# Patient Record
(demographics unavailable — no encounter records)

---

## 2024-10-09 NOTE — HISTORY OF PRESENT ILLNESS
[de-identified] : Patient's 10-year-old right-hand-dominant male injured his left wrist today when he tripped and fell at school at the end of recess.  Patient was complained of pain and swelling in his left wrist he went to [facility] MD was diagnosed with a buckle fracture.  Now presents for hand consultation with his parents.  Denies any numbness or tingling.

## 2024-10-09 NOTE — DATA REVIEWED
[FreeTextEntry1] : X-rays left wrist and forearm from urgent care revealed a left distal radius Salter I fracture possible Salter II as well as a appears to have a buckle fracture type component

## 2024-10-09 NOTE — PROCEDURE
[FreeTextEntry3] : A volar resting splint was applied in the functional position to the left wrist.  Splint care was discussed with patient and parents. The patient and family were advised to keep the splint clean and dry and not to put anything down the splint and to keep it elevated and to do ROM to finger/toes. Compartment syndrome was discussed. Patient was advised if they develop any numbness or tingling at any time or if the splint feels too tight or to lose, they must call the office immediately or go to the ER. They were advised to watch for any issues with circulation.

## 2024-10-09 NOTE — ASSESSMENT
[FreeTextEntry1] : Left distal radius Salter I type fracture possible Salter II or possible buckle component  Plan anti-inflammatories with GI precautions ice 20 minutes on 20 minutes off and the patient is being placed in a volar resting splint for comfort and protection as the fracture happened earlier today and concerned about casting him due to swelling.  I did advise the patient's parents at this time there is a buckle type component to the fracture most likely a Salter I fracture but could also have a Salter II component.  I did advise anytime there is a growth plate injury there is a small chance of growth plate arrest.  Plan is to remain out of sports and gym and seen back later this week by one of our other hand surgeons to place him in a short arm cast when the swelling has resolved and will not swelling more.  All questions were answered they are agreed with the plan they do understand he could require a cast up to 4 to 6 weeks  Patient was advised if they develop any severe pain, numbness or tingling or pain with range of motion to the fingers they must call or go to the emergency room immediately. All the signs and symptoms of compartment syndrome were explained.  The patient understands.  This medical record was created utilizing Dragon voice recognition software.  This software is not perfect and may occasionally create typographical errors which may be reflected in the above medical record.

## 2024-10-09 NOTE — IMAGING
[Left] : left wrist [de-identified] : He is alert and oriented vital signs are stable.  Examination left wrist reveals mild to moderate swelling.  He has no tenderness around the scaphoid volarly or dorsally or the scaphoid ligament or TFCC.  Does have exquisite tenderness at the distal radius growth plate.  The FPL and EPL are intact.  Range of motion testing cannot be performed.  No tenderness around the left elbow. He has a normal neurologic exam Normal vascular exam. Normal skin exam. No evidence for open wounds infection or compartment syndrome. [FreeTextEntry8] : X-rays left wrist 3 views revealed a probable Salter I distal radius fracture possible buckle type component.  Cannot rule out possible Salter II

## 2024-10-11 NOTE — PHYSICAL EXAM
[] : wrist swelling [Distal Radius] : distal radius [Left] : left wrist [The fracture is in acceptable alignment. There is progression in healing seen] : The fracture is in acceptable alignment. There is progression in healing seen [FreeTextEntry8] : Nena fx distal radius

## 2024-10-11 NOTE — DISCUSSION/SUMMARY
[de-identified] : Discussed the nature of the diagnosis and risk and benefits of different modalities of treatment. LT brianna fx distal radius X rays reviewed & discussed  He will splint Rx provided RTO 1 week, repeat x rays

## 2024-10-11 NOTE — HISTORY OF PRESENT ILLNESS
[5] : 5 [3] : 3 [Dull/Aching] : dull/aching [de-identified] : 10 year old male present LT wrist Fx. He tripped and fell on his wrist. He went to  and comes in with x rays   DOI: 10/9/24 [] : no [FreeTextEntry1] : LT wrist  [FreeTextEntry4] : past week [FreeTextEntry5] : New consult here for LT wrist pain fx. Patient fell at the end of recess, went to urgent care and took xrays. Patient is in a soft cast.  [de-identified] : Urgent care, xray

## 2024-10-21 NOTE — REASON FOR VISIT
[Parent] : parent [FreeTextEntry2] : follow up left wrist buckle fracture. He presents with brace on.

## 2024-10-21 NOTE — DISCUSSION/SUMMARY
[de-identified] : Discussed the nature of the diagnosis and risk and benefits of different modalities of treatment. LT brianna fx distal radius X rays reviewed & discussed  He will splint RTO 3.5 weeks

## 2024-10-21 NOTE — HISTORY OF PRESENT ILLNESS
[2] : 2 [de-identified] : 10 year old male followed for LT buckle fx distal radius. He has been splinting   DOI: 10/9/24 [FreeTextEntry1] : left wrist [FreeTextEntry2] : running and tripped and fell. [FreeTextEntry9] : brace

## 2024-11-14 NOTE — DISCUSSION/SUMMARY
[de-identified] : Discussed the nature of the diagnosis and risk and benefits of different modalities of treatment. LT buckle fx distal radius He will d/c splint  He will turn to gym 11/25/24 PRN

## 2024-11-14 NOTE — HISTORY OF PRESENT ILLNESS
[0] : 0 [de-identified] : 10 year old male followed for LT buckle fx distal radius. He has been splinting   DOI: 10/9/24 [FreeTextEntry1] : left wrist [FreeTextEntry2] : running and tripped and fell. [FreeTextEntry9] : brace

## 2025-06-26 NOTE — DISCUSSION/SUMMARY
[de-identified] : Discussed the nature of the diagnosis and risk and benefits of different modalities of treatment. RT wrist buckle fx X rays reviewed & discussed He will splint RTO 3 weeks

## 2025-06-26 NOTE — HISTORY OF PRESENT ILLNESS
[0] : 0 [de-identified] : 11 year old male followed for RT wrist buckle fx. He is splinting.   Pt is accompanied by his mom& dad who help provide a secondary history    DOI: 6/16/25 [] : no [FreeTextEntry1] : RT hand [FreeTextEntry9] : splint [de-identified] : splint

## 2025-07-14 NOTE — DISCUSSION/SUMMARY
[de-identified] : Discussed the nature of the diagnosis and risk and benefits of different modalities of treatment. RT wrist buckle fx He will splint for 1 more week He will return to sports / activity in 2 weeks  PRN

## 2025-07-14 NOTE — HISTORY OF PRESENT ILLNESS
[0] : 0 [de-identified] : 11 year old male followed for RT wrist buckle fx. He is splinting.   Pt is accompanied by grandma who help provide a secondary history    DOI: 6/16/25 [] : no [FreeTextEntry1] : RT hand [FreeTextEntry9] : splint [de-identified] : splint